# Patient Record
Sex: MALE | Race: BLACK OR AFRICAN AMERICAN | NOT HISPANIC OR LATINO | Employment: UNEMPLOYED | URBAN - METROPOLITAN AREA
[De-identification: names, ages, dates, MRNs, and addresses within clinical notes are randomized per-mention and may not be internally consistent; named-entity substitution may affect disease eponyms.]

---

## 2018-07-26 ENCOUNTER — HOSPITAL ENCOUNTER (EMERGENCY)
Facility: HOSPITAL | Age: 23
Discharge: HOME | End: 2018-07-26
Attending: EMERGENCY MEDICINE | Admitting: EMERGENCY MEDICINE

## 2018-07-26 VITALS
SYSTOLIC BLOOD PRESSURE: 121 MMHG | WEIGHT: 111 LBS | TEMPERATURE: 99 F | RESPIRATION RATE: 18 BRPM | HEART RATE: 115 BPM | DIASTOLIC BLOOD PRESSURE: 59 MMHG | OXYGEN SATURATION: 98 %

## 2018-07-26 DIAGNOSIS — B35.0 TINEA CAPITIS: ICD-10-CM

## 2018-07-26 DIAGNOSIS — L65.9 ALOPECIA: Primary | ICD-10-CM

## 2018-07-26 PROCEDURE — 99282 EMERGENCY DEPT VISIT SF MDM: CPT

## 2018-07-26 RX ORDER — KETOCONAZOLE 20 MG/ML
1 SHAMPOO TOPICAL 2 TIMES WEEKLY
Qty: 120 ML | Refills: 0 | Status: SHIPPED | OUTPATIENT
Start: 2018-07-26 | End: 2019-09-30

## 2018-07-26 NOTE — DISCHARGE INSTRUCTIONS
Tinea Capitis   WHAT YOU NEED TO KNOW:   What is tinea capitis? Tinea capitis is a scalp infection caused by a fungus  Tinea capitis is also called ringworm of the scalp or head  It is most common among children  What increases my risk for tinea capitis? Tinea capitis is spread by close contact with an infected person or pet  Your risk is increased if you share towels, hairbrushes, portillo, barrettes, and hats with an infected person  What are the signs and symptoms of tinea capitis? · Hair loss    · Raised, scaly skin    · Itchy scalp    · Black dots on your scalp from broken hairs    · Small, round bumps  How is tinea capitis diagnosed? Your healthcare provider may be able to tell that you have tinea capitis by looking at your scalp  He may also gently scrape off some of your skin and look at the sample through a microscope  This will help him know the type of fungus that is causing your infection  How is tinea capitis treated? Tinea capitis is usually treated with antifungal medicine  It is given as a pill  Take the medicine until it is gone, even if your scalp looks better sooner  Your healthcare provider may also recommend an antifungal cream    How can I prevent the spread of tinea capitis? · Use antifungal shampoo as directed  Use a clean towel each time you wash your hair  Do not scratch your scalp  This may cause the infection to spread to other areas of your scalp  If your child has an infection, he can go to school once he is using medicine and shampoo regularly  · Do not share personal items  Do not share towels, brushes, portillo, or hair accessories  · Wash items in hot water  Wash all towels, clothes, and bedding in hot water  Use laundry soap  Wash brushes and portillo, barrettes, and hats in hot, soapy water  · Keep your skin, hair, and nails clean and dry  Bathe every day  Wash your hands often  · Have infected pets treated by a     A patch of missing fur is a sign of infection in a pet  Wear gloves and long sleeves if you handle an infected animal  Always wash your hands after handling the animal  Vacuum your home to remove infected fur or skin flakes  Disinfect surfaces and bedding that your animal comes into contact with  When should I contact my healthcare provider? · You have a fever  · Your infection continues to spread after 7 days of treatment  · Other areas of your scalp become red, warm, tender, and swollen  · You have questions or concerns about your condition or care  CARE AGREEMENT:   You have the right to help plan your care  Learn about your health condition and how it may be treated  Discuss treatment options with your caregivers to decide what care you want to receive  You always have the right to refuse treatment  The above information is an  only  It is not intended as medical advice for individual conditions or treatments  Talk to your doctor, nurse or pharmacist before following any medical regimen to see if it is safe and effective for you  © 2017 [employer]  Information is for End User's use only and may not be sold, redistributed or otherwise used for commercial purposes  All illustrations and images included in CareNotes® are the copyrighted property of A D A M [employer]  or [employer]asim Moeller

## 2018-07-26 NOTE — ED PROVIDER NOTES
History  Chief Complaint   Patient presents with    Hair/Scalp Problem     Patient reports over the past two months he has had increased hair loss  Patient denies new medication  26 yo male presents for evaluation of hair loss x 2 months  Reports he noticed a patch on his scalp which he noticed was spreading  Patient states that he has not had hair growth in the area for 2 months  Patient denies any itching, scaling, discharge from the patch  Patient has not had this in the past   Patient has not tried anything for this  None       History reviewed  No pertinent past medical history  History reviewed  No pertinent surgical history  History reviewed  No pertinent family history  I have reviewed and agree with the history as documented  Social History   Substance Use Topics    Smoking status: Never Smoker    Smokeless tobacco: Never Used    Alcohol use No        Review of Systems   Constitutional: Negative for chills and fever  Gastrointestinal: Negative for nausea and vomiting  Musculoskeletal: Negative for neck pain and neck stiffness  Skin: Negative for color change, rash and wound  Hair loss       Physical Exam          Physical Exam   Constitutional: He appears well-developed and well-nourished  He is cooperative  No distress  HENT:   Head: Normocephalic and atraumatic  Eyes: Conjunctivae are normal    Neck: Normal range of motion  Neck supple  Musculoskeletal: Normal range of motion  Neurological: He is alert  Skin: Skin is warm  No rash noted  He is not diaphoretic  No erythema  Psychiatric: He has a normal mood and affect         Vital Signs  ED Triage Vitals [07/26/18 1824]   Temperature Pulse Respirations Blood Pressure SpO2   99 °F (37 2 °C) (!) 115 18 121/59 98 %      Temp Source Heart Rate Source Patient Position - Orthostatic VS BP Location FiO2 (%)   Temporal Monitor Sitting Right arm --      Pain Score       No Pain           Vitals: 07/26/18 1824   BP: 121/59   Pulse: (!) 115   Patient Position - Orthostatic VS: Sitting       Visual Acuity      ED Medications  Medications - No data to display    Diagnostic Studies  Results Reviewed     None                 No orders to display              Procedures  Procedures       Phone Contacts  ED Phone Contact    ED Course                               MDM  Number of Diagnoses or Management Options  Alopecia:   Tinea capitis:     CritCare Time    Disposition  Final diagnoses: Alopecia   Tinea capitis     Time reflects when diagnosis was documented in both MDM as applicable and the Disposition within this note     Time User Action Codes Description Comment    7/26/2018  7:45 PM Paulla Angelique Add [L65 9] Alopecia     7/26/2018  7:48 PM Paulla Angelique Add [B35 0] Tinea capitis       ED Disposition     ED Disposition Condition Comment    Discharge  [facility] discharge to home/self care  Condition at discharge: Good        Follow-up Information     Follow up With Specialties Details Why Contact Info    [facility] Schedule an appointment as soon as possible for a visit  [address] [facility] [address] [facility] [address]  [place] [facility] Schedule an appointment as soon as possible for a visit  [address] [place] [address]  [id number] [phone]          There are no discharge medications for this patient  No discharge procedures on file      ED Provider  Electronically Signed by           Gerardo Bean PA-C  07/27/18 0949

## 2019-09-30 ENCOUNTER — HOSPITAL ENCOUNTER (EMERGENCY)
Facility: HOSPITAL | Age: 24
Discharge: HOME | End: 2019-09-30
Attending: EMERGENCY MEDICINE | Admitting: EMERGENCY MEDICINE

## 2019-09-30 VITALS
WEIGHT: 119.05 LBS | SYSTOLIC BLOOD PRESSURE: 117 MMHG | HEART RATE: 103 BPM | OXYGEN SATURATION: 100 % | RESPIRATION RATE: 16 BRPM | TEMPERATURE: 99 F | DIASTOLIC BLOOD PRESSURE: 75 MMHG

## 2019-09-30 DIAGNOSIS — R21 RASH AND NONSPECIFIC SKIN ERUPTION: ICD-10-CM

## 2019-09-30 DIAGNOSIS — J02.0 STREPTOCOCCAL PHARYNGITIS: Primary | ICD-10-CM

## 2019-09-30 LAB — S PYO AG THROAT QL: POSITIVE

## 2019-09-30 PROCEDURE — 99283 EMERGENCY DEPT VISIT LOW MDM: CPT | Performed by: PHYSICIAN ASSISTANT

## 2019-09-30 PROCEDURE — 99283 EMERGENCY DEPT VISIT LOW MDM: CPT

## 2019-09-30 PROCEDURE — 87430 STREP A AG IA: CPT | Performed by: PHYSICIAN ASSISTANT

## 2019-09-30 RX ORDER — AMOXICILLIN 500 MG/1
500 CAPSULE ORAL EVERY 12 HOURS SCHEDULED
Qty: 20 CAPSULE | Refills: 0 | Status: SHIPPED | OUTPATIENT
Start: 2019-09-30 | End: 2019-10-10

## 2019-09-30 RX ORDER — DIAPER,BRIEF,INFANT-TODD,DISP
EACH MISCELLANEOUS
Qty: 15 G | Refills: 0 | Status: SHIPPED | OUTPATIENT
Start: 2019-09-30 | End: 2020-10-05

## 2019-09-30 RX ORDER — IBUPROFEN 600 MG/1
600 TABLET ORAL EVERY 6 HOURS PRN
Qty: 30 TABLET | Refills: 0 | Status: SHIPPED | OUTPATIENT
Start: 2019-09-30 | End: 2020-10-05

## 2019-09-30 NOTE — ED PROVIDER NOTES
History  Chief Complaint   Patient presents with    Sore Throat     Started yesterday   Rash     On neck  Patient is a 26year old male who presents complaining of a sore throat and diffuse bumps on his skin for 2 days  He denies any recent sick contacts  Denies any cough fever chills nausea vomiting diarrhea congestion  The diffuse bumps in the skin he states are not itchy and resemble goose bumps  They also started 2 days ago they are not itchy or erythematous  He states that his ears H periodically  Known allergies          Prior to Admission Medications   Prescriptions Last Dose Informant Patient Reported? Taking?   ketoconazole (NIZORAL) 2 % shampoo   No No   Sig: Apply 1 application topically 2 (two) times a week for 14 days      Facility-Administered Medications: None       History reviewed  No pertinent past medical history  History reviewed  No pertinent surgical history  History reviewed  No pertinent family history  I have reviewed and agree with the history as documented  Social History     Tobacco Use    Smoking status: Never Smoker    Smokeless tobacco: Never Used   Substance Use Topics    Alcohol use: No    Drug use: No        Review of Systems   Constitutional: Negative for chills, fatigue and fever  HENT: Negative for congestion, ear pain, rhinorrhea, sinus pressure, sneezing and sore throat  Respiratory: Negative for cough, choking, chest tightness, shortness of breath and wheezing  Cardiovascular: Negative for chest pain and palpitations  Gastrointestinal: Negative for abdominal pain, constipation, diarrhea, nausea and vomiting  Hematological: Negative for adenopathy  All other systems reviewed and are negative  Physical Exam  Physical Exam   Constitutional: He is oriented to person, place, and time  He appears well-developed and well-nourished  Non-toxic appearance  He does not appear ill  No distress     HENT:   Head: Normocephalic and atraumatic  Right Ear: Hearing, tympanic membrane and ear canal normal    Left Ear: Hearing, tympanic membrane and ear canal normal    Mouth/Throat: Uvula is midline and mucous membranes are normal  Posterior oropharyngeal erythema present  Tonsils are 2+ on the right  Tonsils are 2+ on the left  Tonsillar exudate  Pulmonary/Chest: Effort normal and breath sounds normal    Abdominal: Soft  Bowel sounds are normal    Neurological: He is alert and oriented to person, place, and time  Skin: Skin is warm  Capillary refill takes less than 2 seconds  Nursing note and vitals reviewed  Vital Signs  ED Triage Vitals [09/30/19 0929]   Temperature Pulse Respirations Blood Pressure SpO2   99 °F (37 2 °C) 103 16 117/75 100 %      Temp Source Heart Rate Source Patient Position - Orthostatic VS BP Location FiO2 (%)   Oral -- Sitting Right arm --      Pain Score       7           Vitals:    09/30/19 0929   BP: 117/75   Pulse: 103   Patient Position - Orthostatic VS: Sitting         Visual Acuity      ED Medications  Medications - No data to display    Diagnostic Studies  Results Reviewed     None                 No orders to display              Procedures  Procedures       ED Course                               MDM  Number of Diagnoses or Management Options  Diagnosis management comments: Strep Pharyngitis  - rapid strep - positive    Unspecified dermatitis  - hydrocortisone cream       Amount and/or Complexity of Data Reviewed  Clinical lab tests: ordered and reviewed    Risk of Complications, Morbidity, and/or Mortality  Presenting problems: low  Diagnostic procedures: minimal  Management options: minimal    Patient Progress  Patient progress: stable      Disposition  Final diagnoses:   None     ED Disposition     None      Follow-up Information    None         Patient's Medications   Discharge Prescriptions    No medications on file     No discharge procedures on file      ED Provider  Electronically Signed by           Timothy Pulliam PA-C  09/30/19 1026

## 2020-02-19 ENCOUNTER — HOSPITAL ENCOUNTER (EMERGENCY)
Facility: HOSPITAL | Age: 25
Discharge: HOME | End: 2020-02-19
Attending: EMERGENCY MEDICINE | Admitting: EMERGENCY MEDICINE
Payer: COMMERCIAL

## 2020-02-19 VITALS
TEMPERATURE: 100.4 F | WEIGHT: 117.73 LBS | OXYGEN SATURATION: 98 % | DIASTOLIC BLOOD PRESSURE: 60 MMHG | RESPIRATION RATE: 16 BRPM | HEART RATE: 98 BPM | SYSTOLIC BLOOD PRESSURE: 132 MMHG

## 2020-02-19 DIAGNOSIS — R68.89 FLU-LIKE SYMPTOMS: Primary | ICD-10-CM

## 2020-02-19 PROCEDURE — 99282 EMERGENCY DEPT VISIT SF MDM: CPT | Performed by: EMERGENCY MEDICINE

## 2020-02-19 PROCEDURE — 99282 EMERGENCY DEPT VISIT SF MDM: CPT

## 2020-02-19 NOTE — ED PROVIDER NOTES
History  Chief Complaint   Patient presents with    Sore Throat     pt c/o sore throat,cough and fever for the past x3 days  pt denies cp/sob  pt denies n/v/d  pt took ibuprofin PTA at 1300     21-year-old male no medical history presents today complaining of flu-like symptoms that began 2 days ago  Reports fever, chills, body aches, sore throat, slight cough  Denies sputum production, chest pain, SOB, wheezing, abdominal pain, nausea, vomiting, diarrhea  Took ibuprofen for his symptoms earlier today  Denies sick contacts  Reports adequate PO intake though is decreased from usual           Prior to Admission Medications   Prescriptions Last Dose Informant Patient Reported? Taking?   hydrocortisone 1 % cream   No No   Sig: Apply to affected area 2 times daily   ibuprofen (MOTRIN) 600 mg tablet   No No   Sig: Take 1 tablet (600 mg total) by mouth every 6 (six) hours as needed for mild pain or moderate pain      Facility-Administered Medications: None       History reviewed  No pertinent past medical history  Past Surgical History:   Procedure Laterality Date    NO PAST SURGERIES         History reviewed  No pertinent family history  I have reviewed and agree with the history as documented  Social History     Tobacco Use    Smoking status: Never Smoker    Smokeless tobacco: Never Used   Substance Use Topics    Alcohol use: Yes     Comment: socially    Drug use: No       Review of Systems   Constitutional: Positive for appetite change, chills and fever  HENT: Positive for sore throat  Respiratory: Positive for cough  Musculoskeletal: Positive for myalgias  Physical Exam  Physical Exam   Constitutional: He appears well-developed and well-nourished  Non-toxic appearance  He appears ill  HENT:   Head: Normocephalic and atraumatic     Right Ear: Tympanic membrane normal    Left Ear: Tympanic membrane normal    Mouth/Throat: Uvula is midline, oropharynx is clear and moist and mucous membranes are normal  No posterior oropharyngeal edema, posterior oropharyngeal erythema or tonsillar abscesses  No tonsillar exudate  Eyes: Pupils are equal, round, and reactive to light  EOM are normal    Neck: Normal range of motion  Neck supple  Cardiovascular: Normal rate and regular rhythm  Pulmonary/Chest: Effort normal and breath sounds normal  No respiratory distress  He has no wheezes  He has no rhonchi  He has no rales  Abdominal: Soft  He exhibits no distension  There is no guarding  Lymphadenopathy:     He has no cervical adenopathy  Neurological: He is alert  Skin: Skin is warm and dry  Capillary refill takes less than 2 seconds  Psychiatric: He has a normal mood and affect  His behavior is normal        Vital Signs  ED Triage Vitals [02/19/20 1539]   Temperature Pulse Respirations Blood Pressure SpO2   100 4 °F (38 °C) 98 16 132/60 98 %      Temp Source Heart Rate Source Patient Position - Orthostatic VS BP Location FiO2 (%)   Oral Monitor Sitting Right arm --      Pain Score       6           Vitals:    02/19/20 1539   BP: 132/60   Pulse: 98   Patient Position - Orthostatic VS: Sitting         Visual Acuity      ED Medications  Medications - No data to display    Diagnostic Studies  Results Reviewed     None                 No orders to display              Procedures  Procedures         ED Course                               MDM      Disposition  Final diagnoses:   Flu-like symptoms     Time reflects when diagnosis was documented in both MDM as applicable and the Disposition within this note     Time User Action Codes Description Comment    2/19/2020  4:10 PM Gena Gina Add [R60 64] Flu-like symptoms       ED Disposition     ED Disposition Condition Date/Time Comment    Discharge Stable Wed Feb 19, 2020  4:10 PM Sue Escobar discharge to home/self care              Follow-up Information     Follow up With Specialties Details Why Contact Info Additional Information    [facility] Medicine Schedule an appointment as soon as possible for a visit   [address], [address] [facility] [address]  [address], [address], [address] [place], [place], [address]          Discharge Medication List as of 2/19/2020  4:11 PM      CONTINUE these medications which have NOT CHANGED    Details   hydrocortisone 1 % cream Apply to affected area 2 times daily, Print      ibuprofen (MOTRIN) 600 mg tablet Take 1 tablet (600 mg total) by mouth every 6 (six) hours as needed for mild pain or moderate pain, Starting Mon 9/30/2019, Print           No discharge procedures on file      PDMP Review     None          ED Provider  Electronically Signed by           Isaiah Peraza PA-C  02/23/20 9490

## 2020-10-04 ENCOUNTER — HOSPITAL ENCOUNTER (EMERGENCY)
Facility: HOSPITAL | Age: 25
Discharge: HOME | End: 2020-10-04
Attending: EMERGENCY MEDICINE | Admitting: EMERGENCY MEDICINE
Payer: COMMERCIAL

## 2020-10-04 VITALS
HEART RATE: 97 BPM | OXYGEN SATURATION: 98 % | DIASTOLIC BLOOD PRESSURE: 66 MMHG | TEMPERATURE: 97.9 F | SYSTOLIC BLOOD PRESSURE: 123 MMHG | WEIGHT: 115.74 LBS | RESPIRATION RATE: 18 BRPM

## 2020-10-04 DIAGNOSIS — J06.9 URI (UPPER RESPIRATORY INFECTION): ICD-10-CM

## 2020-10-04 DIAGNOSIS — J02.9 PHARYNGITIS: Primary | ICD-10-CM

## 2020-10-04 PROCEDURE — 99283 EMERGENCY DEPT VISIT LOW MDM: CPT

## 2020-10-04 PROCEDURE — 99284 EMERGENCY DEPT VISIT MOD MDM: CPT | Performed by: EMERGENCY MEDICINE

## 2020-10-04 RX ORDER — AMOXICILLIN 500 MG/1
500 CAPSULE ORAL 3 TIMES DAILY
Qty: 30 CAPSULE | Refills: 0 | Status: SHIPPED | OUTPATIENT
Start: 2020-10-04 | End: 2020-10-14

## 2020-10-05 ENCOUNTER — HOSPITAL ENCOUNTER (EMERGENCY)
Facility: HOSPITAL | Age: 25
Discharge: HOME | End: 2020-10-05
Attending: EMERGENCY MEDICINE | Admitting: EMERGENCY MEDICINE
Payer: COMMERCIAL

## 2020-10-05 ENCOUNTER — APPOINTMENT (EMERGENCY)
Dept: RADIOLOGY | Facility: HOSPITAL | Age: 25
End: 2020-10-05
Payer: COMMERCIAL

## 2020-10-05 VITALS
TEMPERATURE: 98.6 F | WEIGHT: 117.73 LBS | DIASTOLIC BLOOD PRESSURE: 60 MMHG | HEART RATE: 94 BPM | OXYGEN SATURATION: 96 % | SYSTOLIC BLOOD PRESSURE: 127 MMHG | RESPIRATION RATE: 18 BRPM

## 2020-10-05 DIAGNOSIS — S70.12XA: ICD-10-CM

## 2020-10-05 DIAGNOSIS — M25.552 LEFT HIP PAIN: Primary | ICD-10-CM

## 2020-10-05 PROCEDURE — 99283 EMERGENCY DEPT VISIT LOW MDM: CPT

## 2020-10-05 PROCEDURE — 99284 EMERGENCY DEPT VISIT MOD MDM: CPT | Performed by: EMERGENCY MEDICINE

## 2020-10-05 PROCEDURE — 73502 X-RAY EXAM HIP UNI 2-3 VIEWS: CPT

## 2020-10-05 RX ORDER — IBUPROFEN 400 MG/1
400 TABLET ORAL ONCE
Status: COMPLETED | OUTPATIENT
Start: 2020-10-05 | End: 2020-10-05

## 2020-10-05 RX ORDER — IBUPROFEN 400 MG/1
400 TABLET ORAL EVERY 6 HOURS PRN
Qty: 20 TABLET | Refills: 0 | Status: SHIPPED | OUTPATIENT
Start: 2020-10-05 | End: 2020-10-10

## 2020-10-05 RX ORDER — ACETAMINOPHEN 500 MG
500 TABLET ORAL EVERY 6 HOURS PRN
Qty: 12 TABLET | Refills: 0 | Status: SHIPPED | OUTPATIENT
Start: 2020-10-05 | End: 2020-10-08

## 2020-10-05 RX ADMIN — IBUPROFEN 400 MG: 400 TABLET ORAL at 16:00
